# Patient Record
Sex: MALE | Race: OTHER
[De-identification: names, ages, dates, MRNs, and addresses within clinical notes are randomized per-mention and may not be internally consistent; named-entity substitution may affect disease eponyms.]

---

## 2018-07-25 ENCOUNTER — HOSPITAL ENCOUNTER (EMERGENCY)
Dept: HOSPITAL 72 - EMR | Age: 38
Discharge: HOME | End: 2018-07-25
Payer: MEDICAID

## 2018-07-25 VITALS — HEIGHT: 67 IN | BODY MASS INDEX: 23.54 KG/M2 | WEIGHT: 150 LBS

## 2018-07-25 VITALS — SYSTOLIC BLOOD PRESSURE: 117 MMHG | DIASTOLIC BLOOD PRESSURE: 77 MMHG

## 2018-07-25 VITALS — DIASTOLIC BLOOD PRESSURE: 77 MMHG | SYSTOLIC BLOOD PRESSURE: 117 MMHG

## 2018-07-25 DIAGNOSIS — G44.209: Primary | ICD-10-CM

## 2018-07-25 PROCEDURE — 99283 EMERGENCY DEPT VISIT LOW MDM: CPT

## 2018-07-25 PROCEDURE — 96372 THER/PROPH/DIAG INJ SC/IM: CPT

## 2018-07-25 NOTE — EMERGENCY ROOM REPORT
History of Present Illness


General


Chief Complaint:  Headache


Source:  Patient





Present Illness


HPI


38-year-old male patient presents ER complaining of headache for the past 3 

days.  Reports headache is not the worst of his life.  Reports gradual onset of 

symptoms, denies aura. Reports feels like a vice is on his head.  Denies vision 

changes or vomiting.  Reports some nausea. denies vomiting symptoms.  Denies 

sinus congestion.  Denies fever, chest pain, shortness of breath, neck pain, 

other acute symptoms.  Requesting to know if can get Botox to treat his 

headache. denies rhinorrhea.  Denies tinnitus.  Denies photophobia or 

phonophobia.denies worse headache of life. Denies injury or trauma.


Allergies:  


Coded Allergies:  


     Dust (Verified  Allergy, Unknown, 7/25/18)





Patient History


Past Medical History:  see triage record


Reviewed Nursing Documentation:  PMH: Agreed; PSxH: Agreed





Nursing Documentation-PMH


Past Medical History:  No Stated History





Review of Systems


All Other Systems:  negative except mentioned in HPI





Physical Exam





Vital Signs








  Date Time  Temp Pulse Resp B/P (MAP) Pulse Ox O2 Delivery O2 Flow Rate FiO2


 


7/25/18 18:05 98.8 79 16 117/77 96 Room Air  





 98.8       








Sp02 EP Interpretation:  reviewed, normal


General Appearance:  well appearing, no apparent distress, alert, GCS 15, non-

toxic


Head:  normocephalic, atraumatic


Eyes:  bilateral eye normal inspection, bilateral eye PERRL


ENT:  hearing grossly normal, normal pharynx, no angioedema, normal voice, TMs 

+ canals normal, uvula midline, moist mucus membranes


Neck:  full range of motion


Respiratory:  lungs clear, normal breath sounds, no rhonchi, no respiratory 

distress, no accessory muscle use, no wheezing, speaking full sentences


Cardiovascular #1:  regular rate, rhythm, no edema


Musculoskeletal:  back normal, digits/nails normal, gait/station normal, normal 

range of motion, non-tender


Neurologic:  alert, oriented x3, responsive, CNs III-XII nml as tested, motor 

strength/tone normal, SLR negative, sensory intact, cerebellar normal, normal 

gait, speech normal


Psychiatric:  mood/affect normal


Skin:  no rash


Lymphatic:  no adenopathy





Medical Decision Making


PA Attestation


Dr. Portillo  is my supervising Physician whom patient management has been 

discussed with.


Diagnostic Impression:  


 Primary Impression:  


 Tension headache


ER Course


Pt presents to ED c/o headache.





DDX considered but are not limited to migraine, cluster HA, tension HA, 

meningitis, ICH, meningitis, HTN, influenza.


Denies worst headache of life, low suspicion for SAH.





VITAL SIGNS are WNL,  patient is afebrile


   


ER COURSE 


Physical exam and history consistent with tension headache.  Provided Toradol 

and Zofran for symptom relief.


Cranial nerves intact as tested, no focal neural deficits, no vision changes, 

EOMs intact,  pupils equal and reactive, denies vomiting or vertigo, low 

suspicion for underlying pathology, does not require CT head scan at this time.

  


Instructed patient to follow-up with PCP for further testing and imaging.  

Discuss referral neurology.


Discuss Botox treatment for headaches at that time.  Do not provide Botox 

treatment in the ER for headaches.


Take melatonin for sleeping difficulty. Discuss symptoms with PCP.


Reports pain symptoms improved.





Patient reports pain improved.


Patient is AOx3, neurologically intact, nontoxic appearing, and ambulatory 

independently with steady gait. OK for discharge.





DISCHARGE: 


-Rx provided Tylenol





At this time pt is stable for d/c to home. Patient is resting comfortably, in 

no acute distress, nontoxic appearing, talking and smiling.


Will provide with patient care instructions and any necessary prescriptions.


Patient to take medication as instructed.


Care plan and follow-up instructions provided.


Patient questions asked and answered.


Patient instructed to follow-up with primary care provider in the next 3 days 

and discuss further referral with PCP to neurologist. 


ER precautions given. Patient instructed to return to ER immediately for any 

new or worsening of symptoms including but not limited to fever, neck stiffness

, vision changes, and neurological symptoms.





- Please note that this Emergency Department Report was dictated using [employer] technology software, occasionally this can lead to 

erroneous entry secondary to interpretation by the dictation equipment.





Last Vital Signs








  Date Time  Temp Pulse Resp B/P (MAP) Pulse Ox O2 Delivery O2 Flow Rate FiO2


 


7/25/18 18:05 98.8 79 16 117/77 96 Room Air  





 98.8       








Disposition:  HOME, SELF-CARE


Condition:  Stable


Scripts


Acetaminophen* (TYLENOL EXTRA STRENGTH*) 500 Mg Tablet


500 MG ORAL Q8H PRN for Prn Headache/Temp > 101, #30 TAB 0 Refills


   Prov: Fred Tidwell         7/25/18


Referrals:  


NON PHYSICIAN (PCP)


Patient Instructions:  Tension Headache





Additional Instructions:  


Followup with primary care provider in 3 -5 days. 


Follow-up with neuro. Discuss botox for headache with PCP.


Take medications as directed. 


Patient questions asked and answered.


ER precautions given, patient instructed to return to ER immediately for any 

new or worsening of symptoms.











Fred Tiwdell Jul 25, 2018 18:42

## 2018-09-01 ENCOUNTER — HOSPITAL ENCOUNTER (EMERGENCY)
Dept: HOSPITAL 72 - EMR | Age: 38
Discharge: HOME | End: 2018-09-01
Payer: COMMERCIAL

## 2018-09-01 VITALS — WEIGHT: 150 LBS | BODY MASS INDEX: 23.54 KG/M2 | HEIGHT: 67 IN

## 2018-09-01 VITALS — DIASTOLIC BLOOD PRESSURE: 87 MMHG | SYSTOLIC BLOOD PRESSURE: 115 MMHG

## 2018-09-01 DIAGNOSIS — B34.9: ICD-10-CM

## 2018-09-01 DIAGNOSIS — R51: Primary | ICD-10-CM

## 2018-09-01 PROCEDURE — 99282 EMERGENCY DEPT VISIT SF MDM: CPT

## 2018-09-02 NOTE — EMERGENCY ROOM REPORT
History of Present Illness


General


Chief Complaint:  Headache


Source:  Patient





Present Illness


HPI


38-year-old male presents ED for evaluation.  Patient states having chills, 

headache, nausea with vomiting1 day. note some diarrhea. Denies any abdominal 

pain.  Pain is dull, 7 out of 10, nonradiating.  Denies photophobia or blurry 

vision.  Denies neck pain.  Denies sore throat or earache.  Denies cough.  

Afebrile in triage.  Denies sick contacts or recent travel.  No other 

aggravating relieving factors.  Denies any other associated symptoms


Allergies:  


Coded Allergies:  


     Dust (Verified  Allergy, Unknown, 7/25/18)





Patient History


Past Medical History:  none


Past Surgical History:  none


Pertinent Family History:  none


Social History:  Denies: smoking, alcohol use, drug use


Immunizations:  UTD


Reviewed Nursing Documentation:  PMH: Agreed; PSxH: Agreed





Nursing Documentation-PMH


Past Medical History:  No Stated History





Review of Systems


All Other Systems:  negative except mentioned in HPI





Physical Exam





Vital Signs








  Date Time  Temp Pulse Resp B/P (MAP) Pulse Ox O2 Delivery O2 Flow Rate FiO2


 


9/1/18 19:50 98.3 70 18 115/87 98 Room Air  





 98.2       








Sp02 EP Interpretation:  reviewed, normal


General Appearance:  no apparent distress, alert, GCS 15, non-toxic


Head:  normocephalic, atraumatic


Eyes:  bilateral eye normal inspection, bilateral eye PERRL


ENT:  hearing grossly normal, normal pharynx, no angioedema, normal voice


Neck:  full range of motion, supple/symm/no masses


Respiratory:  chest non-tender, lungs clear, normal breath sounds, speaking 

full sentences


Cardiovascular #1:  regular rate, rhythm, no edema


Cardiovascular #2:  2+ carotid (R), 2+ carotid (L), 2+ radial (R), 2+ radial (L)

, 2+ dorsalis pedis (R), 2+ dorsalis pedis (L)


Gastrointestinal:  normal bowel sounds, non tender, soft, non-distended, no 

guarding, no rebound


Rectal:  deferred


Genitourinary:  normal inspection, no CVA tenderness


Musculoskeletal:  back normal, gait/station normal, normal range of motion, non-

tender


Neurologic:  alert, oriented x3, responsive, motor strength/tone normal, 

sensory intact, speech normal


Psychiatric:  judgement/insight normal, memory normal, mood/affect normal, no 

suicidal/homicidal ideation


Reflexes:  3+ bicep (R), 3+ bicep (L), 3+ tricep (R), 3+ tricep (L), 3+ knee (R)

, 3+ knee (L)


Skin:  normal color, no rash, warm/dry, well hydrated


Lymphatic:  no adenopathy





Medical Decision Making


Diagnostic Impression:  


 Primary Impression:  


 Viral syndrome


 Additional Impression:  


 Headache


 Qualified Codes:  R51 - Headache


ER Course


Hospital Course 


38-year-old M presents to ED complaining of chills + headache + vomiting 





Differential diagnoses include: URI, pharyngitis, otitis media, influenza 





Clinical course


Patient placed on stretcher.  After initial history, physical exam reveals a 

male in no acute distress.  Bilateral TM unremarkable.  No pharyngeal erythema 

or exudates.  Lungs clear.  No lymphadenopathy.  Abdomen soft.  No nuchal 

rigidity.





Vital stable.  Afebrile.  Likely viral syndrome. also has diarrhea consistent 

with symptoms. course is self-limited.  Reassurance given to patient.  Patient 

can be safely discharged to home or close follow-up with PMD





Diagnosis - viral syndrome, headache





Stable and discharged home with prescriptions for motrin.  drink plenty of 

fluids.  Instructed to followup with PMD.  Return to ED if symptoms recur or 

worsen





Last Vital Signs








  Date Time  Temp Pulse Resp B/P (MAP) Pulse Ox O2 Delivery O2 Flow Rate FiO2


 


9/1/18 20:14 98.3  16 121/66 99 Room Air  


 


9/1/18 19:50  70      








Status:  improved


Disposition:  HOME, SELF-CARE


Condition:  Stable


Scripts


Ibuprofen* (MOTRIN*) 600 Mg Tablet


600 MG ORAL Q8H PRN for For Pain, #30 TAB 0 Refills


   Prov: Brad Giles MD         9/1/18


Referrals:  


[facility]ERR[facility] (PCP)


Patient Instructions:  Viral Respiratory Infection, Easy-To-Read











Brad Giles MD Sep 2, 2018 14:27

## 2019-04-04 ENCOUNTER — HOSPITAL ENCOUNTER (EMERGENCY)
Dept: HOSPITAL 72 - EMR | Age: 39
Discharge: HOME | End: 2019-04-04
Payer: MEDICAID

## 2019-04-04 VITALS
BODY MASS INDEX: 23.54 KG/M2 | DIASTOLIC BLOOD PRESSURE: 67 MMHG | HEIGHT: 67 IN | WEIGHT: 150 LBS | SYSTOLIC BLOOD PRESSURE: 143 MMHG

## 2019-04-04 VITALS — SYSTOLIC BLOOD PRESSURE: 143 MMHG | DIASTOLIC BLOOD PRESSURE: 67 MMHG
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
## 2019-04-04 DIAGNOSIS — T23.231A: ICD-10-CM
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
## 2019-04-04 DIAGNOSIS — T31.0: ICD-10-CM

## 2019-04-04 DIAGNOSIS — T23.201A: Primary | ICD-10-CM

## 2019-04-04 PROCEDURE — 96375 TX/PRO/DX INJ NEW DRUG ADDON: CPT

## 2019-04-04 PROCEDURE — 96374 THER/PROPH/DIAG INJ IV PUSH: CPT

## 2019-04-04 PROCEDURE — 99284 EMERGENCY DEPT VISIT MOD MDM: CPT

## 2019-04-04 NOTE — NUR
ED Nurse Note:

Wounds cleaned and silvadene applied to areas. Patient fingers were wrapped 
with tune gauze.
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
## 2019-04-04 NOTE — NUR
ED Nurse Note:

Patient cleared for discharge, shoes, socks and shorts provided before 
depature. PAtient IV was removed along with ID band. Patient is A&Ox4, 
ambulatory with steady gait, and has no s/s of acute distress. Patient 
discharged in stable condition, took all belongings, verbalized understanding 
of discharge instructions before departure.